# Patient Record
Sex: MALE | Race: OTHER | Employment: OTHER
[De-identification: names, ages, dates, MRNs, and addresses within clinical notes are randomized per-mention and may not be internally consistent; named-entity substitution may affect disease eponyms.]

---

## 2024-01-16 ENCOUNTER — HOSPITAL ENCOUNTER (OUTPATIENT)
Facility: HOSPITAL | Age: 29
Setting detail: RECURRING SERIES
Discharge: HOME | End: 2024-01-19
Payer: OTHER GOVERNMENT

## 2024-01-16 PROCEDURE — 97535 SELF CARE MNGMENT TRAINING: CPT

## 2024-01-16 PROCEDURE — 97110 THERAPEUTIC EXERCISES: CPT

## 2024-01-16 PROCEDURE — 97162 PT EVAL MOD COMPLEX 30 MIN: CPT

## 2024-01-16 NOTE — PROGRESS NOTES
[facility]  [address] [facility] [address] [place] [address] Ph:[phone] Fx: [phone]    PLAN OF CARE/ Statement of Necessity for Physical Therapy Services           Patient name: Benji Jennings Start of Care: 2024   Referral source: Sarah Chavez* : 1995    Medical Diagnosis: Right leg pain [M79.604]  Left leg pain [M79.605]       Onset Date: 2023   Treatment Diagnosis: M25.561  RIGHT KNEE PAIN and M25.562  LEFT KNEE PAIN                                      Prior Hospitalization: see medical history Provider#: [id number]   Medications: Verified on Patient Summary List     Comorbidities:  Both knee pain.    Prior Level of Function:  functionally independent, no AD. Due to pain has limitations in going up and down the stairs, squatting and running     The Plan of Care and following information is based on the information from the initial evaluation.    Assessment / key information:  The patient is a 28-year-old male referred to therapy with IT band syndrome. The patient reported a current pain level of 3/10 in both knees which gets worsen to 8/10 with squatting, prolong resting and weight bearing activities . The patient pointed pain around and under both patella. The patient reports he has had pain for last 8 years and received steroid shots, PRP injections and multiple times physical therapy with no improvement. Pt had his both feet scanned for shoe inserts and he received them yesterday. During the objective assessment, the patient demonstrated normal AROM of both knee. Additionally, the patient showed positive tenderness of quadriceps tendons and vastus lateralis, stiffness of IT band resulting in functional limitations. Furthermore, pt showed positive patellar compression test on both with possible patellofemoral syndrome.  Skilled therapy is recommended to address the above deficits and help the patient return to her prior 
                   Therapeutic Procedures:  Tx Min Billable or 1:1 Min (if diff from Tx Min) Procedure, Rationale, Specifics   10  06716 Therapeutic Exercise (timed):  increase ROM, strength, coordination, balance, and proprioception to improve patient's ability to progress to PLOF and address remaining functional goals. (see flow sheet as applicable)     Details if applicable:     10  75989 Self Care/Home Management (timed):  improve patient knowledge and understanding of diagnosis/prognosis, physical therapy expectations, procedures and progression, and HEP  to improve patient's ability to progress to PLOF and address remaining functional goals.  (see flow sheet as applicable)     Details if applicable:            Details if applicable:            Details if applicable:            Details if applicable:     20  [facility] Totals Reminder: bill using total billable min of TIMED therapeutic procedures (example: do not include dry needle or estim unattended, both untimed codes, in totals to left)  8-22 min = 1 unit; 23-37 min = 2 units; 38-52 min = 3 units; 53-67 min = 4 units; 68-82 min = 5 units   Total Total     [x]  Patient Education billed concurrently with other procedures   [x] Review HEP    [] Progressed/Changed HEP, detail:    [] Other detail:           General Evaluation    Gait: normal gait  Palpation/Sensation: Cracking of both knee during AROM.     ROM:                                        AROM    PROM   Knee Left Right Left Right   Extension 0 135     Flexion 0 135          Strength (MMT):                                            Hip Left (1-5) Right (1-5)   Hip Flexion 5 5   Hip Extension 5 5   Hip ABD 5 5   Hip ADD 5 5   Hip ER     Hip IR       Knee Left (1-5) Right (1-5)   Knee Flexion 5 5   Knee Extension 5 5   Ankle PF     Ankle DF     Other       Special Tests:    Knee  Left Right   Varus Stress Test -ve -ve   Valgus Stress Test -ve -ve   Lachman's -ve -ve   Apprehension     Judit's -ve -ve

## 2024-01-24 ENCOUNTER — HOSPITAL ENCOUNTER (OUTPATIENT)
Facility: HOSPITAL | Age: 29
Setting detail: RECURRING SERIES
Discharge: HOME | End: 2024-01-27
Payer: OTHER GOVERNMENT

## 2024-01-24 PROCEDURE — 97112 NEUROMUSCULAR REEDUCATION: CPT

## 2024-01-24 PROCEDURE — 97140 MANUAL THERAPY 1/> REGIONS: CPT

## 2024-01-24 PROCEDURE — 97110 THERAPEUTIC EXERCISES: CPT

## 2024-01-24 NOTE — PROGRESS NOTES
PHYSICAL / OCCUPATIONAL THERAPY - DAILY TREATMENT NOTE     Patient Name: Benji Jennings    Date: 2024    : 1995  Insurance: Payor:  EAST / Plan:  EAST / Product Type: *No Product type* /      Patient  verified Yes     Visit #   Current / Total 2 24   Time   In / Out 10:30 11:11   Pain   In / Out 3/10 2/10   Subjective Functional Status/Changes: The patient states no change regarding his pain upon arrival.   Changes to:  Allergies, Med Hx, Sx Hx?   no       TREATMENT AREA =  Right knee pain [M25.561]  Left knee pain [M25.562]    OBJECTIVE  Therapeutic Procedures:  Tx Min Billable or 1:1 Min (if diff from Tx Min) Procedure, Rationale, Specifics   21  23121 Therapeutic Exercise (timed):  increase ROM, strength, coordination, balance, and proprioception to improve patient's ability to progress to PLOF and address remaining functional goals. (see flow sheet as applicable)    Details if applicable:       12  54829 Neuromuscular Re-Education (timed):  improve balance, coordination, kinesthetic sense, posture, core stability and proprioception to improve patient's ability to develop conscious control of individual muscles and awareness of position of extremities in order to progress to PLOF and address remaining functional goals. (see flow sheet as applicable)    Details if applicable:     8  49140 Manual Therapy (timed):  decrease pain, increase ROM, and increase tissue extensibility to improve patient's ability to progress to PLOF and address remaining functional goals.  The manual therapy interventions were performed at a separate and distinct time from the therapeutic activities interventions . Details: Elly GT5, GT4 performed pt seated through patellar ligament B as well as vastus lateralis and ITB in demetra stretch      Details if applicable:     41  [facility] Totals Reminder: bill using total billable min of TIMED therapeutic procedures (example: do not include dry needle or estim

## 2024-01-30 ENCOUNTER — HOSPITAL ENCOUNTER (OUTPATIENT)
Facility: HOSPITAL | Age: 29
Setting detail: RECURRING SERIES
Discharge: HOME | End: 2024-02-02
Payer: OTHER GOVERNMENT

## 2024-01-30 PROCEDURE — 97112 NEUROMUSCULAR REEDUCATION: CPT

## 2024-01-30 PROCEDURE — 97140 MANUAL THERAPY 1/> REGIONS: CPT

## 2024-01-30 PROCEDURE — 97110 THERAPEUTIC EXERCISES: CPT

## 2024-01-30 PROCEDURE — 97530 THERAPEUTIC ACTIVITIES: CPT

## 2024-01-30 NOTE — PROGRESS NOTES
PHYSICAL / OCCUPATIONAL THERAPY - DAILY TREATMENT NOTE     Patient Name: Benji Jennings    Date: 2024    : 1995  Insurance: Payor:  EAST / Plan: [employer] EAST / Product Type: *No Product type* /      Patient  verified Yes     Visit #   Current / Total 3 24   Time   In / Out 10:36 11:19   Pain   In / Out 3 3   Subjective Functional Status/Changes: Pt reported feeling normal today   Changes to:  Allergies, Med Hx, Sx Hx?   no       TREATMENT AREA =  Right knee pain [M25.561]  Left knee pain [M25.562]    OBJECTIVE        Therapeutic Procedures:  Tx Min Billable or 1:1 Min (if diff from Tx Min) Procedure, Rationale, Specifics   15  20972 Therapeutic Exercise (timed):  increase ROM, strength, coordination, balance, and proprioception to improve patient's ability to progress to PLOF and address remaining functional goals. (see flow sheet as applicable)    Details if applicable:       10  92531 Therapeutic Activity (timed):  use of dynamic activities replicating functional movements to increase ROM, strength, coordination, balance, and proprioception in order to improve patient's ability to progress to PLOF and address remaining functional goals.  (see flow sheet as applicable)    Details if applicable:     8  03224 Manual Therapy (timed):  decrease pain, increase ROM, and increase tissue extensibility to improve patient's ability to progress to PLOF and address remaining functional goals.  The manual therapy interventions were performed at a separate and distinct time from the therapeutic activities interventions . Details:   IASTM using Graston on bilateral knees in supine with knees flexed, patellar mobilization grade ll in supine.    Details if applicable:     10  38561 Neuromuscular Re-Education (timed):  improve balance, coordination, kinesthetic sense, posture, core stability and proprioception to improve patient's ability to develop conscious control of individual muscles and awareness of

## 2024-02-08 ENCOUNTER — HOSPITAL ENCOUNTER (OUTPATIENT)
Facility: HOSPITAL | Age: 29
Setting detail: RECURRING SERIES
Discharge: HOME | End: 2024-02-11
Payer: OTHER GOVERNMENT

## 2024-02-08 PROCEDURE — 97530 THERAPEUTIC ACTIVITIES: CPT

## 2024-02-08 PROCEDURE — 97535 SELF CARE MNGMENT TRAINING: CPT

## 2024-02-08 PROCEDURE — 97016 VASOPNEUMATIC DEVICE THERAPY: CPT

## 2024-02-08 PROCEDURE — 97110 THERAPEUTIC EXERCISES: CPT

## 2024-02-08 NOTE — PROGRESS NOTES
PHYSICAL / OCCUPATIONAL THERAPY - DAILY TREATMENT NOTE     Patient Name: Benji Jennings    Date: 2024    : 1995  Insurance: Payor: [employer] EAST / Plan: [employer] EAST / Product Type: *No Product type* /      Patient  verified Yes     Visit #   Current / Total 4 24   Time   In / Out 10:33 11:26   Pain   In / Out 3 1-2   Subjective Functional Status/Changes: Pt reported feeling alright.   Changes to:  Allergies, Med Hx, Sx Hx?   no       TREATMENT AREA =  Right knee pain [M25.561]  Left knee pain [M25.562]    OBJECTIVE    Modalities Rationale:     decrease pain and increase tissue extensibility to improve patient's ability to progress to PLOF and address remaining functional goals.     min []  Paraffin,  details:    10 min [x]  Vasopneumatic Device, press/temp: LP/LT    min []  Whirlpool / Fluido:    If using vaso (only need to measure limb vaso being performed on)      pre-treatment girth : 33.8 cms      post-treatment girth : 33 cms      measured at (landmark location) :  Mid patella    min []  Other:    Skin assessment post-treatment (if applicable):    []  intact    []  redness- no adverse reaction                 []redness - adverse reaction:         Therapeutic Procedures:  Tx Min Billable or 1:1 Min (if diff from Tx Min) Procedure, Rationale, Specifics   20  07632 Therapeutic Exercise (timed):  increase ROM, strength, coordination, balance, and proprioception to improve patient's ability to progress to PLOF and address remaining functional goals. (see flow sheet as applicable)    Details if applicable:       13  59999 Therapeutic Activity (timed):  use of dynamic activities replicating functional movements to increase ROM, strength, coordination, balance, and proprioception in order to improve patient's ability to progress to PLOF and address remaining functional goals.  (see flow sheet as applicable)    Details if applicable:     10  36171 Self Care/Home Management (timed):  improve patient

## 2024-02-14 ENCOUNTER — HOSPITAL ENCOUNTER (OUTPATIENT)
Facility: HOSPITAL | Age: 29
Setting detail: RECURRING SERIES
Discharge: HOME | End: 2024-02-17
Payer: OTHER GOVERNMENT

## 2024-02-14 PROCEDURE — 97110 THERAPEUTIC EXERCISES: CPT

## 2024-02-14 PROCEDURE — 97112 NEUROMUSCULAR REEDUCATION: CPT

## 2024-02-14 PROCEDURE — 97140 MANUAL THERAPY 1/> REGIONS: CPT

## 2024-02-14 PROCEDURE — 97530 THERAPEUTIC ACTIVITIES: CPT

## 2024-02-14 NOTE — PROGRESS NOTES
PHYSICAL / OCCUPATIONAL THERAPY - DAILY TREATMENT NOTE     Patient Name: Benji Jennings    Date: 2024    : 1995  Insurance: Payor:  EAST / Plan:  EAST / Product Type: *No Product type* /      Patient  verified Yes     Visit #   Current / Total 5 24   Time   In / Out 11:01 11:50   Pain   In / Out 5 5   Subjective Functional Status/Changes: Pt reported more pain secondary to the workout yesterday   Changes to:  Allergies, Med Hx, Sx Hx?   no       TREATMENT AREA =  Right knee pain [M25.561]  Left knee pain [M25.562]    OBJECTIVE          Therapeutic Procedures:  Tx Min Billable or 1:1 Min (if diff from Tx Min) Procedure, Rationale, Specifics   20  06713 Therapeutic Exercise (timed):  increase ROM, strength, coordination, balance, and proprioception to improve patient's ability to progress to PLOF and address remaining functional goals. (see flow sheet as applicable)    Details if applicable:       10  11954 Therapeutic Activity (timed):  use of dynamic activities replicating functional movements to increase ROM, strength, coordination, balance, and proprioception in order to improve patient's ability to progress to PLOF and address remaining functional goals.  (see flow sheet as applicable)    Details if applicable:     10  97770 Manual Therapy (timed):  decrease pain, increase ROM, increase tissue extensibility, and decrease trigger points to improve patient's ability to progress to PLOF and address remaining functional goals.  The manual therapy interventions were performed at a separate and distinct time from the therapeutic activities interventions . Details: IASTM using Graston on bilateral knees on the quadriceps  and patellar ligament with pt seated.    Details if applicable:     9  02567 Neuromuscular Re-Education (timed):  improve balance, coordination, kinesthetic sense, posture, core stability and proprioception to improve patient's ability to develop conscious control of

## 2024-02-14 NOTE — PROGRESS NOTES
[facility]  [address] [facility] [address] [place] [address] - Ph: [phone]   Fx: [phone]    PHYSICAL THERAPY PROGRESS NOTE      Patient name: Benji Jennings Start of Care: 2024    Referral source: Scott Sarahblanquita Castellanos* : 1995    Medical Diagnosis: Right knee pain [M25.561]  Left knee pain [M25.562]  Payor:  EAST / Plan: [employer] EAST / Product Type: *No Product type* /  Onset Date:2023     Treatment Diagnosis:  M25.561  RIGHT KNEE PAIN and M25.562  LEFT KNEE PAIN                         Prior Hospitalization: see medical history Provider#: [id number]   Medications: Verified on Patient summary List   Comorbidities: Both knee pain.     Prior Level of Function: functionally independent, no AD. Due to pain has limitations in going up and down the stairs, squatting and running      Visits from Start of Care: 5    Missed Visits: 0    Goals/Measure of Progress: To be achieved in 12 weeks:    Short Term Goals: To be accomplished in 4 weeks:  Patient will be independent and compliant with HEP to progress toward goals and restore functional mobility.   Eval Status: issued at eval              PN: Met  Patient will improve pain in both knee to 6/10 at worst to improve stair negitiation tolerance and restore prior level of function.  Eval Status: 8/10 at worst   PN: Still the same, 8/10 worst pain levels  Long Term Goals: To be accomplished in 12 weeks:  Patient will improve FOTO score by 80 points to improve  functional activities and return to PLOF.  Eval Status: FOTO 71  FOTO score = an established functional score where 100 = no disability       PN: Regressed, 69  2.   Patient will improve pain in both knee to 3/10 at worst to improve slow runnning tolerance and restore prior level of function.  Eval Status: 8/10 at worst              Current: Progressed, worst pain 7-8/10. 2024              PN: Still the same, 8/10 worst pain

## 2024-02-16 ENCOUNTER — HOSPITAL ENCOUNTER (OUTPATIENT)
Facility: HOSPITAL | Age: 29
Setting detail: RECURRING SERIES
Discharge: HOME | End: 2024-02-19
Payer: OTHER GOVERNMENT

## 2024-02-16 PROCEDURE — 97140 MANUAL THERAPY 1/> REGIONS: CPT

## 2024-02-16 PROCEDURE — 97110 THERAPEUTIC EXERCISES: CPT

## 2024-02-16 PROCEDURE — 97112 NEUROMUSCULAR REEDUCATION: CPT

## 2024-02-16 PROCEDURE — 97530 THERAPEUTIC ACTIVITIES: CPT

## 2024-02-16 NOTE — PROGRESS NOTES
PHYSICAL / OCCUPATIONAL THERAPY - DAILY TREATMENT NOTE     Patient Name: Benji Jennings    Date: 2024    : 1995  Insurance: Payor: [employer] EAST / Plan: [employer] EAST / Product Type: *No Product type* /      Patient  verified Yes     Visit #   Current / Total 6 48   Time   In / Out 11:53 12:36   Pain   In / Out 6/10 left calf, 3/10 knees 6/10 Left calf, 3/10 knees   Subjective Functional Status/Changes: Pt reports (B) gastroc pain, Left being significant.   Changes to:  Allergies, Med Hx, Sx Hx?   no       TREATMENT AREA =  Right knee pain [M25.561]  Left knee pain [M25.562]    OBJECTIVE    Therapeutic Procedures:  Tx Min Billable or 1:1 Min (if diff from Tx Min) Procedure, Rationale, Specifics   17  75036 Therapeutic Exercise (timed):  increase ROM, strength, coordination, balance, and proprioception to improve patient's ability to progress to PLOF and address remaining functional goals. (see flow sheet as applicable)    Details if applicable:       8  41360 Neuromuscular Re-Education (timed):  improve balance, coordination, kinesthetic sense, posture, core stability and proprioception to improve patient's ability to develop conscious control of individual muscles and awareness of position of extremities in order to progress to PLOF and address remaining functional goals. (see flow sheet as applicable)    Details if applicable:  ROSA MARIA Metcalf squats   8  68572 Therapeutic Activity (timed):  use of dynamic activities replicating functional movements to increase ROM, strength, coordination, balance, and proprioception in order to improve patient's ability to progress to PLOF and address remaining functional goals.  (see flow sheet as applicable)     Details if applicable:  RDL's, deadlifts   10  89257 Manual Therapy (timed):  decrease pain, increase ROM, increase tissue extensibility, and decrease trigger points to improve patient's ability to progress to PLOF and address remaining functional goals.  The

## 2024-02-20 ENCOUNTER — HOSPITAL ENCOUNTER (OUTPATIENT)
Facility: HOSPITAL | Age: 29
Setting detail: RECURRING SERIES
Discharge: HOME | End: 2024-02-23
Payer: OTHER GOVERNMENT

## 2024-02-20 PROCEDURE — 97530 THERAPEUTIC ACTIVITIES: CPT

## 2024-02-20 PROCEDURE — 97110 THERAPEUTIC EXERCISES: CPT

## 2024-02-20 PROCEDURE — 97140 MANUAL THERAPY 1/> REGIONS: CPT

## 2024-02-20 PROCEDURE — 97112 NEUROMUSCULAR REEDUCATION: CPT

## 2024-02-20 NOTE — PROGRESS NOTES
PHYSICAL / OCCUPATIONAL THERAPY - DAILY TREATMENT NOTE     Patient Name: Benji Jennings    Date: 2024    : 1995  Insurance: Payor: [employer] EAST / Plan:  EAST / Product Type: *No Product type* /      Patient  verified Yes     Visit #   Current / Total 7 48   Time   In / Out 11:51 12:30   Pain   In / Out 3/10 3/10   Subjective Functional Status/Changes: \"Knees were sore for a full day after the Graston.\"   Changes to:  Allergies, Med Hx, Sx Hx?   no       TREATMENT AREA =  Right knee pain [M25.561]  Left knee pain [M25.562]    OBJECTIVE    Therapeutic Procedures:  Tx Min Billable or 1:1 Min (if diff from Tx Min) Procedure, Rationale, Specifics   13  37622 Therapeutic Exercise (timed):  increase ROM, strength, coordination, balance, and proprioception to improve patient's ability to progress to PLOF and address remaining functional goals. (see flow sheet as applicable)    Details if applicable:       8  57916 Neuromuscular Re-Education (timed):  improve balance, coordination, kinesthetic sense, posture, core stability and proprioception to improve patient's ability to develop conscious control of individual muscles and awareness of position of extremities in order to progress to PLOF and address remaining functional goals. (see flow sheet as applicable)    Details if applicable:  ROSA MARIA Metcalf squats    8  82756 Therapeutic Activity (timed):  use of dynamic activities replicating functional movements to increase ROM, strength, coordination, balance, and proprioception in order to improve patient's ability to progress to PLOF and address remaining functional goals.  (see flow sheet as applicable)     Details if applicable:  RDL's, deadlifts    10  25346 Manual Therapy (timed):  decrease pain, increase ROM, increase tissue extensibility, and decrease trigger points to improve patient's ability to progress to PLOF and address remaining functional goals.  The manual therapy interventions were

## 2024-02-26 ENCOUNTER — HOSPITAL ENCOUNTER (OUTPATIENT)
Facility: HOSPITAL | Age: 29
Setting detail: RECURRING SERIES
Discharge: HOME | End: 2024-02-29
Payer: OTHER GOVERNMENT

## 2024-02-26 PROCEDURE — 97140 MANUAL THERAPY 1/> REGIONS: CPT

## 2024-02-26 PROCEDURE — 97112 NEUROMUSCULAR REEDUCATION: CPT

## 2024-02-26 PROCEDURE — 97110 THERAPEUTIC EXERCISES: CPT

## 2024-02-26 PROCEDURE — 97530 THERAPEUTIC ACTIVITIES: CPT

## 2024-02-26 NOTE — PROGRESS NOTES
Status: 8/10 at worst  PN: Still the same, 8/10 worst pain levels  Current: Remains 8/10. 2/20/2024  Long Term Goals: To be accomplished in 12 weeks:  Patient will improve FOTO score by 80 points to improve  functional activities and return to PLOF.  Eval Status: FOTO 71  FOTO score = an established functional score where 100 = no disability        PN: Regressed, 69  2.   Patient will improve pain in both knee to 3/10 at worst to improve slow runnning tolerance and restore prior level of function.  Eval Status: 8/10 at worst              PN: Still the same, 8/10 worst pain levels              Current: Remains 8/10, 2/20/2024    PLAN  Yes  Continue plan of care  []  Upgrade activities as tolerated  []  Discharge due to :  []  Other:    Maximo Maldonado PTA    2/26/2024    5:51 PM    Future Appointments   Date Time Provider Department Center   3/4/2024 11:50 AM [facility]   3/11/2024 11:50 AM Maximo Maldonado PTA [facility]

## 2024-03-04 ENCOUNTER — HOSPITAL ENCOUNTER (OUTPATIENT)
Facility: HOSPITAL | Age: 29
Setting detail: RECURRING SERIES
Discharge: HOME | End: 2024-03-07
Payer: OTHER GOVERNMENT

## 2024-03-04 PROCEDURE — 97140 MANUAL THERAPY 1/> REGIONS: CPT

## 2024-03-04 PROCEDURE — 97530 THERAPEUTIC ACTIVITIES: CPT

## 2024-03-04 PROCEDURE — 97110 THERAPEUTIC EXERCISES: CPT

## 2024-03-04 PROCEDURE — 97112 NEUROMUSCULAR REEDUCATION: CPT

## 2024-03-04 NOTE — PROGRESS NOTES
PHYSICAL / OCCUPATIONAL THERAPY - DAILY TREATMENT NOTE     Patient Name: Benji Jennings    Date: 3/4/2024    : 1995  Insurance: Payor:  EAST / Plan: [employer] EAST / Product Type: *No Product type* /      Patient  verified Yes     Visit #   Current / Total 9 48   Time   In / Out 11:54 12:33   Pain   In / Out 5/10 <5/10   Subjective Functional Status/Changes: Pt reports increased (B) knee pain today after running 1.5 miles at ~9 minute pace.   Changes to:  Allergies, Med Hx, Sx Hx?   no       TREATMENT AREA =  Right knee pain [M25.561]  Left knee pain [M25.562]    OBJECTIVE    Therapeutic Procedures:  Tx Min Billable or 1:1 Min (if diff from Tx Min) Procedure, Rationale, Specifics   23  01462 Therapeutic Exercise (timed):  increase ROM, strength, coordination, balance, and proprioception to improve patient's ability to progress to PLOF and address remaining functional goals. (see flow sheet as applicable)    Details if applicable:       8  62083 Neuromuscular Re-Education (timed):  improve balance, coordination, kinesthetic sense, posture, core stability and proprioception to improve patient's ability to develop conscious control of individual muscles and awareness of position of extremities in order to progress to PLOF and address remaining functional goals. (see flow sheet as applicable)    Details if applicable:  Dixon, TRX squats, shuttle press hip 3-way.    8  37223 Manual Therapy (timed):  decrease pain, increase ROM, increase tissue extensibility, and decrease trigger points to improve patient's ability to progress to PLOF and address remaining functional goals.  The manual therapy interventions were performed at a separate and distinct time from the therapeutic activities interventions . Details:      Details if applicable:  Graston technique to (B) quads, patella tendon, framed patella. Pt seated.      39  [facility] Totals Reminder: bill using total billable min of TIMED therapeutic procedures

## 2024-03-05 NOTE — PROGRESS NOTES
[facility]  [address]  [place] [address]  [phone] fax    Physical Therapy Discharge Summary  Patient name: Benji Jennings Start of Care: 24   Referral source: Sarah Chavez* : 1995   Medical/Treatment Diagnosis: Right knee pain [M25.561]  Left knee pain [M25.562]  Payor:  EAST / Plan:  EAST / Product Type: *No Product type* /  Onset Date:23     Prior Hospitalization: see medical history Provider#: [id number]   Medications: Verified on Patient Summary List    Comorbidities: Both knee pain.     Prior Level of Function: functionally independent, no AD. Due to pain has limitations in going up and down the stairs, squatting and running      Visits from Start of Care: 9    Missed Visits: 0    Reporting Period : 24 to 3/4/24    Goals/Measure of Progress:  Short Term Goals: To be accomplished in 4 weeks:  Patient will be independent and compliant with HEP to progress toward goals and restore functional mobility.   Eval Status: issued at eval              PN: Met  Patient will improve pain in both knee to 6/10 at worst to improve stair negitiation tolerance and restore prior level of function.  Eval Status: 8/10 at worst  PN: Still the same, 8/10 worst pain levels  Current: Remains 8/10. 2024  Long Term Goals: To be accomplished in 12 weeks:  Patient will improve FOTO score by 80 points to improve  functional activities and return to PLOF.  Eval Status: FOTO 71  FOTO score = an established functional score where 100 = no disability        PN: Regressed, 69        Current: Regressed to 64%. 3/4/2024  2.   Patient will improve pain in both knee to 3/10 at worst to improve slow runnning tolerance and restore prior level of function.  Eval Status: 8/10 at worst              PN: Still the same, 8/10 worst pain levels              Current: Remains 8/10, 2024    Assessment/ Summary of Care: No

## 2024-03-11 ENCOUNTER — APPOINTMENT (OUTPATIENT)
Facility: HOSPITAL | Age: 29
End: 2024-03-11
Payer: OTHER GOVERNMENT